# Patient Record
Sex: FEMALE | Race: BLACK OR AFRICAN AMERICAN | NOT HISPANIC OR LATINO | Employment: UNEMPLOYED | URBAN - NONMETROPOLITAN AREA
[De-identification: names, ages, dates, MRNs, and addresses within clinical notes are randomized per-mention and may not be internally consistent; named-entity substitution may affect disease eponyms.]

---

## 2018-03-09 ENCOUNTER — APPOINTMENT (OUTPATIENT)
Dept: GENERAL RADIOLOGY | Facility: HOSPITAL | Age: 28
End: 2018-03-09

## 2018-03-09 ENCOUNTER — HOSPITAL ENCOUNTER (EMERGENCY)
Facility: HOSPITAL | Age: 28
Discharge: HOME | End: 2018-03-09
Attending: EMERGENCY MEDICINE | Admitting: EMERGENCY MEDICINE

## 2018-03-09 VITALS
SYSTOLIC BLOOD PRESSURE: 139 MMHG | HEART RATE: 91 BPM | OXYGEN SATURATION: 97 % | RESPIRATION RATE: 16 BRPM | WEIGHT: 204 LBS | TEMPERATURE: 98.2 F | DIASTOLIC BLOOD PRESSURE: 87 MMHG | BODY MASS INDEX: 32.02 KG/M2 | HEIGHT: 67 IN

## 2018-03-09 DIAGNOSIS — S39.012A LUMBAR STRAIN, INITIAL ENCOUNTER: Primary | ICD-10-CM

## 2018-03-09 DIAGNOSIS — R10.2 PELVIC PAIN: ICD-10-CM

## 2018-03-09 LAB
B-HCG UR QL: NEGATIVE
BASOPHILS # BLD AUTO: 0.03 10*3/MM3 (ref 0–0.2)
BASOPHILS NFR BLD AUTO: 0.5 % (ref 0–2.5)
BILIRUB UR QL STRIP: NEGATIVE
CLARITY UR: CLEAR
CLUE CELLS SPEC QL WET PREP: NORMAL
COLOR UR: YELLOW
DEPRECATED RDW RBC AUTO: 40.9 FL (ref 37–54)
EOSINOPHIL # BLD AUTO: 0.08 10*3/MM3 (ref 0–0.7)
EOSINOPHIL NFR BLD AUTO: 1.2 % (ref 0–7)
ERYTHROCYTE [DISTWIDTH] IN BLOOD BY AUTOMATED COUNT: 15 % (ref 11.5–14.5)
GLUCOSE UR STRIP-MCNC: NEGATIVE MG/DL
HCT VFR BLD AUTO: 37.7 % (ref 37–47)
HGB BLD-MCNC: 12.2 G/DL (ref 12–16)
HGB UR QL STRIP.AUTO: NEGATIVE
HYDATID CYST SPEC WET PREP: NORMAL
IMM GRANULOCYTES # BLD: 0.03 10*3/MM3 (ref 0–0.06)
IMM GRANULOCYTES NFR BLD: 0.5 % (ref 0–0.6)
KETONES UR QL STRIP: NEGATIVE
KOH PREP NAIL: NORMAL
LEUKOCYTE ESTERASE UR QL STRIP.AUTO: NEGATIVE
LYMPHOCYTES # BLD AUTO: 1.61 10*3/MM3 (ref 0.6–3.4)
LYMPHOCYTES NFR BLD AUTO: 24.5 % (ref 10–50)
MCH RBC QN AUTO: 24.3 PG (ref 27–31)
MCHC RBC AUTO-ENTMCNC: 32.4 G/DL (ref 30–37)
MCV RBC AUTO: 75.1 FL (ref 81–99)
MONOCYTES # BLD AUTO: 0.37 10*3/MM3 (ref 0–0.9)
MONOCYTES NFR BLD AUTO: 5.6 % (ref 0–12)
NEUTROPHILS # BLD AUTO: 4.46 10*3/MM3 (ref 2–6.9)
NEUTROPHILS NFR BLD AUTO: 67.7 % (ref 37–80)
NITRITE UR QL STRIP: NEGATIVE
NRBC BLD MANUAL-RTO: 0 /100 WBC (ref 0–0)
PH UR STRIP.AUTO: 6 [PH] (ref 5–8)
PLATELET # BLD AUTO: 370 10*3/MM3 (ref 130–400)
PMV BLD AUTO: 9.6 FL (ref 6–12)
PROT UR QL STRIP: NEGATIVE
RBC # BLD AUTO: 5.02 10*6/MM3 (ref 4.2–5.4)
SP GR UR STRIP: 1.02 (ref 1–1.03)
T VAGINALIS SPEC QL WET PREP: NORMAL
UROBILINOGEN UR QL STRIP: NORMAL
WBC NRBC COR # BLD: 6.58 10*3/MM3 (ref 4.8–10.8)
WBC SPEC QL WET PREP: NORMAL
YEAST GENITAL QL WET PREP: NORMAL

## 2018-03-09 PROCEDURE — 87491 CHLMYD TRACH DNA AMP PROBE: CPT | Performed by: NURSE PRACTITIONER

## 2018-03-09 PROCEDURE — 87210 SMEAR WET MOUNT SALINE/INK: CPT | Performed by: NURSE PRACTITIONER

## 2018-03-09 PROCEDURE — 99283 EMERGENCY DEPT VISIT LOW MDM: CPT

## 2018-03-09 PROCEDURE — 87591 N.GONORRHOEAE DNA AMP PROB: CPT | Performed by: NURSE PRACTITIONER

## 2018-03-09 PROCEDURE — 81003 URINALYSIS AUTO W/O SCOPE: CPT | Performed by: NURSE PRACTITIONER

## 2018-03-09 PROCEDURE — 85025 COMPLETE CBC W/AUTO DIFF WBC: CPT | Performed by: NURSE PRACTITIONER

## 2018-03-09 PROCEDURE — 72100 X-RAY EXAM L-S SPINE 2/3 VWS: CPT

## 2018-03-09 PROCEDURE — 87220 TISSUE EXAM FOR FUNGI: CPT | Performed by: NURSE PRACTITIONER

## 2018-03-09 PROCEDURE — 81025 URINE PREGNANCY TEST: CPT | Performed by: NURSE PRACTITIONER

## 2018-03-09 RX ORDER — IBUPROFEN 800 MG/1
800 TABLET ORAL
Qty: 21 TABLET | Refills: 0 | Status: SHIPPED | OUTPATIENT
Start: 2018-03-09

## 2018-03-09 RX ORDER — CYCLOBENZAPRINE HCL 10 MG
10 TABLET ORAL 2 TIMES DAILY PRN
Qty: 14 TABLET | Refills: 0 | Status: SHIPPED | OUTPATIENT
Start: 2018-03-09

## 2018-03-09 NOTE — DISCHARGE INSTRUCTIONS
"Back Injury Prevention  Back injuries can be very painful. They can also be difficult to heal. After having one back injury, you are more likely to injure your back again. It is important to learn how to avoid injuring or re-injuring your back. The following tips can help you to prevent a back injury.  What should I know about physical fitness?  · Exercise for 30 minutes per day on most days of the week or as told by your doctor. Make sure to:  ¨ Do aerobic exercises, such as walking, jogging, biking, or swimming.  ¨ Do exercises that increase balance and strength, such as luis manuel chi and yoga.  ¨ Do stretching exercises. This helps with flexibility.  ¨ Try to develop strong belly (abdominal) muscles. Your belly muscles help to support your back.  · Stay at a healthy weight. This helps to decrease your risk of a back injury.  What should I know about my diet?  · Talk with your doctor about your overall diet. Take supplements and vitamins only as told by your doctor.  · Talk with your doctor about how much calcium and vitamin D you need each day. These nutrients help to prevent weakening of the bones (osteoporosis).  · Include good sources of calcium in your diet, such as:  ¨ Dairy products.  ¨ Green leafy vegetables.  ¨ Products that have had calcium added to them (fortified).  · Include good sources of vitamin D in your diet, such as:  ¨ Milk.  ¨ Foods that have had vitamin D added to them.  What should I know about my posture?  · Sit up straight and stand up straight. Avoid leaning forward when you sit or hunching over when you stand.  · Choose chairs that have good low-back (lumbar) support.  · If you work at a desk, sit close to it so you do not need to lean over. Keep your chin tucked in. Keep your neck drawn back. Keep your elbows bent so your arms look like the letter \"L\" (right angle).  · Sit high and close to the steering wheel when you drive. Add a low-back support to your car seat, if needed.  · Avoid sitting " or standing in one position for very long. Take breaks to get up, stretch, and walk around at least one time every hour. Take breaks every hour if you are driving for long periods of time.  · Sleep on your side with your knees slightly bent, or sleep on your back with a pillow under your knees. Do not lie on the front of your body to sleep.  What should I know about lifting, twisting, and reaching?  Lifting and Heavy Lifting     · Avoid heavy lifting, especially lifting over and over again. If you must do heavy lifting:  ¨ Stretch before lifting.  ¨ Work slowly.  ¨ Rest between lifts.  ¨ Use a tool such as a cart or a theron to move objects if one is available.  ¨ Make several small trips instead of carrying one heavy load.  ¨ Ask for help when you need it, especially when moving big objects.  · Follow these steps when lifting:  ¨ Stand with your feet shoulder-width apart.  ¨ Get as close to the object as you can. Do not  a heavy object that is far from your body.  ¨ Use handles or lifting straps if they are available.  ¨ Bend at your knees. Squat down, but keep your heels off the floor.  ¨ Keep your shoulders back. Keep your chin tucked in. Keep your back straight.  ¨ Lift the object slowly while you tighten the muscles in your legs, belly, and butt. Keep the object as close to the center of your body as possible.  · Follow these steps when putting down a heavy load:  ¨ Stand with your feet shoulder-width apart.  ¨ Lower the object slowly while you tighten the muscles in your legs, belly, and butt. Keep the object as close to the center of your body as possible.  ¨ Keep your shoulders back. Keep your chin tucked in. Keep your back straight.  ¨ Bend at your knees. Squat down, but keep your heels off the floor.  ¨ Use handles or lifting straps if they are available.  Twisting and Reaching   · Avoid lifting heavy objects above your waist.  · Do not twist at your waist while you are lifting or carrying a load. If  you need to turn, move your feet.  · Do not bend over without bending at your knees.  · Avoid reaching over your head, across a table, or for an object on a high surface.  What are some other tips?  · Avoid wet floors and icy ground. Keep sidewalks clear of ice to prevent falls.  · Do not sleep on a mattress that is too soft or too hard.  · Keep items that you use often within easy reach.  · Put heavier objects on shelves at waist level, and put lighter objects on lower or higher shelves.  · Find ways to lower your stress, such as:  ¨ Exercise.  ¨ Massage.  ¨ Relaxation techniques.  · Talk with your doctor if you feel anxious or depressed. These conditions can make back pain worse.  · Wear flat heel shoes with cushioned soles.  · Avoid making quick (sudden) movements.  · Use both shoulder straps when carrying a backpack.  · Do not use any tobacco products, including cigarettes, chewing tobacco, or electronic cigarettes. If you need help quitting, ask your doctor.  This information is not intended to replace advice given to you by your health care provider. Make sure you discuss any questions you have with your health care provider.  Document Released: 06/05/2009 Document Revised: 05/25/2017 Document Reviewed: 12/22/2015  [employer] Patient Education © 2017 [employer]

## 2018-03-10 NOTE — ED PROVIDER NOTES
Subjective   History of Present Illness  Patient presents with complaints of falling on her low back about a week ago.  She states she was walking and slipped and fell.  She has taken one dose of ibuprofen, she has not iced it or used she is continuing to have pain.  She said now she starting have some discomfort that's coming around to her lower pelvic area.  She states she's having some white discharge.  She denies any odor.  Denies fever or chills.  She denies nausea vomiting or diarrhea.  Review of Systems   All other systems reviewed and are negative.      History reviewed. No pertinent past medical history.    No Known Allergies    Past Surgical History:   Procedure Laterality Date   • BREAST RECONSTRUCTION      breast reduction       History reviewed. No pertinent family history.    Social History     Social History   • Marital status: Single     Social History Main Topics   • Smoking status: Current Every Day Smoker     Packs/day: 0.50     Types: Cigars   • Alcohol use No   • Drug use: No           Objective   Physical Exam   Constitutional: She is oriented to person, place, and time. She appears well-developed and well-nourished.   HENT:   Head: Normocephalic and atraumatic.   Mouth/Throat: Oropharynx is clear and moist.   Eyes: Conjunctivae are normal. Pupils are equal, round, and reactive to light.   Neck: Normal range of motion. Neck supple.   Cardiovascular: Normal rate, regular rhythm and normal heart sounds.    Pulmonary/Chest: Effort normal and breath sounds normal.   Abdominal: Soft. Bowel sounds are normal. There is tenderness.   Mild tenderness in the lower quadrants bilaterally.  No guarding or rebound tenderness.   Musculoskeletal: Normal range of motion.   Neurological: She is alert and oriented to person, place, and time. She has normal reflexes.   Skin: Skin is warm and dry.   Psychiatric: She has a normal mood and affect. Her behavior is normal. Judgment and thought content normal.   Nursing  note and vitals reviewed.      Procedures         ED Course  ED Course   Comment By Time   CBC is normal.  KOH demonstrates no yeast, no WBCs, no clue cells, no Trichomonas UA is negative.  Urine pregnancy is negative.  Lumbar x-rays negative for any acute abnormalities. Mary Isabella Day, APRN 03/09 2251      Will discharge with Motrin and Flexeril.  I demonstrated back stretching exercises to her.  I recommended that she alternate with ice and heat.  Symptoms do not improve she should follow-up with her primary care provider.            MDM    Final diagnoses:   Lumbar strain, initial encounter   Pelvic pain            Mary Day, APRN  03/09/18 [id number]

## 2018-03-12 LAB
C TRACH RRNA SPEC DONR QL NAA+PROBE: NEGATIVE
N GONORRHOEA DNA SPEC QL NAA+PROBE: NEGATIVE